# Patient Record
Sex: MALE | HISPANIC OR LATINO | URBAN - METROPOLITAN AREA
[De-identification: names, ages, dates, MRNs, and addresses within clinical notes are randomized per-mention and may not be internally consistent; named-entity substitution may affect disease eponyms.]

---

## 2021-11-22 ENCOUNTER — OFFICE VISIT (OUTPATIENT)
Dept: INTERNAL MEDICINE | Facility: CLINIC | Age: 56
End: 2021-11-22

## 2021-11-22 VITALS
RESPIRATION RATE: 20 BRPM | HEIGHT: 65 IN | HEART RATE: 67 BPM | WEIGHT: 194 LBS | BODY MASS INDEX: 32.32 KG/M2 | TEMPERATURE: 96.9 F | OXYGEN SATURATION: 98 % | SYSTOLIC BLOOD PRESSURE: 132 MMHG | DIASTOLIC BLOOD PRESSURE: 90 MMHG

## 2021-11-22 DIAGNOSIS — N52.9 ERECTILE DYSFUNCTION, UNSPECIFIED ERECTILE DYSFUNCTION TYPE: Primary | ICD-10-CM

## 2021-11-22 DIAGNOSIS — R73.03 PREDIABETES: ICD-10-CM

## 2021-11-22 DIAGNOSIS — M54.41 BILATERAL LOW BACK PAIN WITH BILATERAL SCIATICA, UNSPECIFIED CHRONICITY: ICD-10-CM

## 2021-11-22 DIAGNOSIS — M54.42 BILATERAL LOW BACK PAIN WITH BILATERAL SCIATICA, UNSPECIFIED CHRONICITY: ICD-10-CM

## 2021-11-22 PROBLEM — M54.9 BACK PAIN: Status: ACTIVE | Noted: 2021-11-22

## 2021-11-22 PROCEDURE — 99214 OFFICE O/P EST MOD 30 MIN: CPT | Performed by: PHYSICIAN ASSISTANT

## 2021-11-22 RX ORDER — SILDENAFIL 25 MG/1
25 TABLET, FILM COATED ORAL DAILY PRN
Qty: 10 TABLET | Refills: 0 | Status: SHIPPED | OUTPATIENT
Start: 2021-11-22

## 2021-11-22 NOTE — PROGRESS NOTES
Chief Complaint  Back Pain    Subjective          History of Present Illness  Jhonny Montiel presents to [facility] to establish care.  Back Pain:  Doing better with his pain, the medication they gave him worked. He is not having sciatica pain any longer, no weakness or numbness, no loss of bowel or bladder.     ED:  Is requesting a medication for ED. Has not been on this medication before but would like it as an as needed medication. He has difficulty getting and maintaining erection. He is on vacation visiting his wife and is requesting this medication for the short term.   Last labs were 4-5 months ago, all normal, no hx of liver/kidney problems. No hx of AMI or stroke. No hx of CP or SOA, never had to see cardiologist in the past, never had to take nitroglycerine for CP. No hx of HTN or heart problems.     PreDM:  Checks sugar infrequently, it is 110-130s or less. He takes metformin prn.      Review of Systems   Constitutional: Negative for fever and unexpected weight loss.   Respiratory: Negative for cough, shortness of breath and wheezing.    Cardiovascular: Negative for chest pain and palpitations.   Genitourinary: Positive for erectile dysfunction. Negative for penile swelling, scrotal swelling and testicular pain.   Musculoskeletal: Negative for back pain.       The following portions of the patient's history were reviewed and updated as appropriate: allergies, current medications, past family history, past medical history, past social history, past surgical history and problem list.    No Known Allergies  Current Outpatient Medications on File Prior to Visit   Medication Sig Dispense Refill   • cyclobenzaprine (FLEXERIL) 10 MG tablet 1 tablet up to 3 times daily when not at work.  When at work 1 tablet at bedtime 15 tablet 0   • predniSONE (DELTASONE) 20 MG tablet 3 tablets daily for 7 days 21 tablet 0   • rosuvastatin (CRESTOR) 10 MG tablet Take 10 mg by mouth Daily. Pt  "takes every other night.     • metFORMIN (GLUCOPHAGE) 500 MG tablet Take 500 mg by mouth 2 (Two) Times a Day With Meals. Pt \"takes PRN when he sees his sugar is high\".       No current facility-administered medications on file prior to visit.     New Medications Ordered This Visit   Medications   • sildenafil (Viagra) 25 MG tablet     Sig: Take 1 tablet by mouth Daily As Needed for Erectile Dysfunction.     Dispense:  10 tablet     Refill:  0       Past Medical History:   Diagnosis Date   • Diabetes mellitus (HCC)    • Hyperlipidemia       History reviewed. No pertinent surgical history.   History reviewed. No pertinent family history.   Social History     Socioeconomic History   • Marital status:    Tobacco Use   • Smoking status: Never Smoker   • Smokeless tobacco: Never Used   Vaping Use   • Vaping Use: Never used   Substance and Sexual Activity   • Alcohol use: Never   • Drug use: Never   • Sexual activity: Defer        Objective   Vital Signs:   Vitals:    11/22/21 0901   BP: 132/90   Pulse: 67   Resp: 20   Temp: 96.9 °F (36.1 °C)   TempSrc: Temporal   SpO2: 98%   Weight: 88 kg (194 lb)   Height: 163.8 cm (64.5\")   PainSc: 0-No pain      Body mass index is 32.79 kg/m².  Physical Exam  Vitals reviewed.   Constitutional:       General: He is not in acute distress.     Appearance: Normal appearance.   HENT:      Head: Normocephalic and atraumatic.   Eyes:      General: No scleral icterus.     Extraocular Movements: Extraocular movements intact.      Conjunctiva/sclera: Conjunctivae normal.   Cardiovascular:      Rate and Rhythm: Normal rate and regular rhythm.      Heart sounds: Normal heart sounds. No murmur heard.      Pulmonary:      Effort: Pulmonary effort is normal. No respiratory distress.      Breath sounds: Normal breath sounds. No stridor. No wheezing or rhonchi.   Musculoskeletal:      Cervical back: Normal range of motion and neck supple.   Skin:     General: Skin is warm and dry.      " Coloration: Skin is not jaundiced.   Neurological:      General: No focal deficit present.      Mental Status: He is alert and oriented to person, place, and time.      Gait: Gait normal.   Psychiatric:         Mood and Affect: Mood normal.         Behavior: Behavior normal.          Result Review :                   Assessment and Plan    Diagnoses and all orders for this visit:    1. Erectile dysfunction, unspecified erectile dysfunction type (Primary)  Assessment & Plan:  Will rx prn Viagra however adv he should f/u with pcp in [place] for work up and labs. Pt refused labs today. Does not have insurance. Pt states he will f/u with pcp in the next month regarding a work up. Reviewed risks of medication, ER if has erection >4hr.    Orders:  -     sildenafil (Viagra) 25 MG tablet; Take 1 tablet by mouth Daily As Needed for Erectile Dysfunction.  Dispense: 10 tablet; Refill: 0    2. Bilateral low back pain with bilateral sciatica, unspecified chronicity  Assessment & Plan:  Resolving, cont prn prednisone and flexeril      3. Prediabetes  Assessment & Plan:  Stable, cont metformin          Follow Up   Return if symptoms worsen or fail to improve.    Follow up if symptoms worsen or persist or has new or concerning symptoms, go to ER for severe symptoms.   Reviewed common medication effects and side effects and to report side effects immediately, the patient expressed good understanding.  Encouraged medication compliance and the importance of keeping scheduled follow up appointments with me and any other providers.  If a referral was made please contact our office if you have not heard about an appointment in the next 2 weeks.   If labs or images are ordered we will contact you with the results within the next week.  If you have not heard from us after a week please call our office to inquire about the results.   Patient was given instructions and counseling regarding his condition or for health maintenance advice.  Please see specific information pulled into the AVS if appropriate.     Stephanie Elizondo PA-C    * Please note that portions of this note were completed with a voice recognition program.

## 2021-11-22 NOTE — ASSESSMENT & PLAN NOTE
Will rx prn Viagra however adv he should f/u with pcp in [place] for work up and labs. Pt refused labs today. Does not have insurance. Pt states he will f/u with pcp in the next month regarding a work up. Reviewed risks of medication, ER if has erection >4hr.

## 2022-03-22 ENCOUNTER — TELEPHONE (OUTPATIENT)
Dept: INTERNAL MEDICINE | Facility: CLINIC | Age: 57
End: 2022-03-22